# Patient Record
Sex: FEMALE | Race: BLACK OR AFRICAN AMERICAN | URBAN - METROPOLITAN AREA
[De-identification: names, ages, dates, MRNs, and addresses within clinical notes are randomized per-mention and may not be internally consistent; named-entity substitution may affect disease eponyms.]

---

## 2022-03-07 ENCOUNTER — HISTORICAL (OUTPATIENT)
Dept: ADMINISTRATIVE | Facility: HOSPITAL | Age: 37
End: 2022-03-07

## 2022-03-12 ENCOUNTER — HISTORICAL (OUTPATIENT)
Dept: ADMINISTRATIVE | Facility: HOSPITAL | Age: 37
End: 2022-03-12

## 2022-03-23 ENCOUNTER — HISTORICAL (OUTPATIENT)
Dept: ADMINISTRATIVE | Facility: HOSPITAL | Age: 37
End: 2022-03-23

## 2022-03-23 LAB
APPEARANCE, UA: CLEAR
BACTERIA SPEC CULT: NORMAL
BILIRUB UR QL STRIP: NEGATIVE
BUDDING YEAST: NORMAL
CASTS, UA: NORMAL
COLOR UR: YELLOW
CRP SERPL-MCNC: 0.43 MG/L
CRYSTALS: NORMAL
ERYTHROCYTE [SEDIMENTATION RATE] IN BLOOD: 18 MM/H (ref 0–20)
EST. AVERAGE GLUCOSE BLD GHB EST-MCNC: 128.4 MG/DL
GLUCOSE (UA): NEGATIVE
HBA1C MFR BLD: 6.1 %
HGB UR QL STRIP: NORMAL
KETONES UR QL STRIP: NEGATIVE
LEUKOCYTE ESTERASE UR QL STRIP: NORMAL
NITRITE UR QL STRIP: NEGATIVE
PH UR STRIP: 6 [PH] (ref 5–9)
PROT UR QL STRIP: NEGATIVE
RBC #/AREA URNS HPF: 22 /[HPF] (ref 0–2)
SMALL ROUND CELLS, UA: NORMAL
SP GR UR STRIP: 1.02 (ref 1–1.03)
SPERM URNS QL MICRO: NORMAL
SQUAMOUS EPITHELIAL, UA: NORMAL (ref 0–4)
T3FREE SERPL-MCNC: 2.65 PG/ML (ref 1.58–3.91)
T4 SERPL-MCNC: 8.59 UG/DL (ref 4.87–11.72)
TSH SERPL-ACNC: 0.98 M[IU]/L (ref 0.35–4.94)
UROBILINOGEN UR STRIP-ACNC: 0.2
WBC #/AREA URNS HPF: NORMAL /[HPF] (ref 0–2)

## 2025-06-02 ENCOUNTER — OFFICE VISIT (OUTPATIENT)
Dept: INTERNAL MEDICINE | Facility: CLINIC | Age: 40
End: 2025-06-02
Payer: MEDICAID

## 2025-06-02 ENCOUNTER — LAB VISIT (OUTPATIENT)
Dept: LAB | Facility: HOSPITAL | Age: 40
End: 2025-06-02
Payer: MEDICAID

## 2025-06-02 VITALS
HEIGHT: 72 IN | HEART RATE: 74 BPM | RESPIRATION RATE: 16 BRPM | OXYGEN SATURATION: 100 % | TEMPERATURE: 98 F | DIASTOLIC BLOOD PRESSURE: 81 MMHG | BODY MASS INDEX: 39.68 KG/M2 | WEIGHT: 293 LBS | SYSTOLIC BLOOD PRESSURE: 125 MMHG

## 2025-06-02 DIAGNOSIS — Z00.00 WELL ADULT EXAM: ICD-10-CM

## 2025-06-02 DIAGNOSIS — Z11.3 SCREENING FOR STD (SEXUALLY TRANSMITTED DISEASE): ICD-10-CM

## 2025-06-02 DIAGNOSIS — Z80.3 FAMILY HISTORY OF BREAST CANCER: ICD-10-CM

## 2025-06-02 DIAGNOSIS — Z00.00 WELL ADULT EXAM: Primary | ICD-10-CM

## 2025-06-02 DIAGNOSIS — Z87.19 HISTORY OF SMALL BOWEL OBSTRUCTION: ICD-10-CM

## 2025-06-02 LAB
ALBUMIN SERPL-MCNC: 3.8 G/DL (ref 3.5–5)
ALBUMIN/GLOB SERPL: 1 RATIO (ref 1.1–2)
ALP SERPL-CCNC: 79 UNIT/L (ref 40–150)
ALT SERPL-CCNC: 12 UNIT/L (ref 0–55)
ANION GAP SERPL CALC-SCNC: 7 MEQ/L
AST SERPL-CCNC: 15 UNIT/L (ref 11–45)
BACTERIA #/AREA URNS AUTO: ABNORMAL /HPF
BASOPHILS # BLD AUTO: 0.04 X10(3)/MCL
BASOPHILS NFR BLD AUTO: 0.8 %
BILIRUB SERPL-MCNC: 0.5 MG/DL
BILIRUB UR QL STRIP.AUTO: NEGATIVE
BUN SERPL-MCNC: 12 MG/DL (ref 7–18.7)
C TRACH DNA SPEC QL NAA+PROBE: NOT DETECTED
CALCIUM SERPL-MCNC: 9.7 MG/DL (ref 8.4–10.2)
CHLORIDE SERPL-SCNC: 109 MMOL/L (ref 98–107)
CHOLEST SERPL-MCNC: 228 MG/DL
CHOLEST/HDLC SERPL: 5 {RATIO} (ref 0–5)
CLARITY UR: CLEAR
CO2 SERPL-SCNC: 24 MMOL/L (ref 22–29)
COLOR UR AUTO: YELLOW
CREAT SERPL-MCNC: 0.87 MG/DL (ref 0.55–1.02)
CREAT/UREA NIT SERPL: 14
EOSINOPHIL # BLD AUTO: 0.09 X10(3)/MCL (ref 0–0.9)
EOSINOPHIL NFR BLD AUTO: 1.8 %
ERYTHROCYTE [DISTWIDTH] IN BLOOD BY AUTOMATED COUNT: 13.8 % (ref 11.5–17)
EST. AVERAGE GLUCOSE BLD GHB EST-MCNC: 125.5 MG/DL
GFR SERPLBLD CREATININE-BSD FMLA CKD-EPI: >60 ML/MIN/1.73/M2
GLOBULIN SER-MCNC: 4 GM/DL (ref 2.4–3.5)
GLUCOSE SERPL-MCNC: 101 MG/DL (ref 74–100)
GLUCOSE UR QL STRIP: NORMAL
HAV IGM SERPL QL IA: NONREACTIVE
HBA1C MFR BLD: 6 %
HBV CORE IGM SERPL QL IA: NONREACTIVE
HBV SURFACE AG SERPL QL IA: NONREACTIVE
HCT VFR BLD AUTO: 43.4 % (ref 37–47)
HCV AB SERPL QL IA: NONREACTIVE
HDLC SERPL-MCNC: 45 MG/DL (ref 35–60)
HGB BLD-MCNC: 13.3 G/DL (ref 12–16)
HGB UR QL STRIP: NEGATIVE
HIV 1+2 AB+HIV1 P24 AG SERPL QL IA: NONREACTIVE
HYALINE CASTS #/AREA URNS LPF: ABNORMAL /LPF
IMM GRANULOCYTES # BLD AUTO: 0.01 X10(3)/MCL (ref 0–0.04)
IMM GRANULOCYTES NFR BLD AUTO: 0.2 %
KETONES UR QL STRIP: NEGATIVE
LDLC SERPL CALC-MCNC: 165 MG/DL (ref 50–140)
LEUKOCYTE ESTERASE UR QL STRIP: NEGATIVE
LYMPHOCYTES # BLD AUTO: 2.12 X10(3)/MCL (ref 0.6–4.6)
LYMPHOCYTES NFR BLD AUTO: 42.4 %
MCH RBC QN AUTO: 25.1 PG (ref 27–31)
MCHC RBC AUTO-ENTMCNC: 30.6 G/DL (ref 33–36)
MCV RBC AUTO: 81.9 FL (ref 80–94)
MONOCYTES # BLD AUTO: 0.32 X10(3)/MCL (ref 0.1–1.3)
MONOCYTES NFR BLD AUTO: 6.4 %
MUCOUS THREADS URNS QL MICRO: ABNORMAL /LPF
N GONORRHOEA DNA SPEC QL NAA+PROBE: NOT DETECTED
NEUTROPHILS # BLD AUTO: 2.42 X10(3)/MCL (ref 2.1–9.2)
NEUTROPHILS NFR BLD AUTO: 48.4 %
NITRITE UR QL STRIP: NEGATIVE
NRBC BLD AUTO-RTO: 0 %
PH UR STRIP: 5.5 [PH]
PLATELET # BLD AUTO: 321 X10(3)/MCL (ref 130–400)
PLATELETS.RETICULATED NFR BLD AUTO: 1.9 % (ref 0.9–11.2)
PMV BLD AUTO: 9.8 FL (ref 7.4–10.4)
POTASSIUM SERPL-SCNC: 3.7 MMOL/L (ref 3.5–5.1)
PROT SERPL-MCNC: 7.8 GM/DL (ref 6.4–8.3)
PROT UR QL STRIP: NEGATIVE
RBC # BLD AUTO: 5.3 X10(6)/MCL (ref 4.2–5.4)
RBC #/AREA URNS AUTO: ABNORMAL /HPF
SODIUM SERPL-SCNC: 140 MMOL/L (ref 136–145)
SP GR UR STRIP.AUTO: 1.03 (ref 1–1.03)
SPECIMEN SOURCE: NORMAL
SQUAMOUS #/AREA URNS LPF: ABNORMAL /HPF
T PALLIDUM AB SER QL: NONREACTIVE
T4 FREE SERPL-MCNC: 0.95 NG/DL (ref 0.7–1.48)
TRIGL SERPL-MCNC: 91 MG/DL (ref 37–140)
TSH SERPL-ACNC: 1.62 UIU/ML (ref 0.35–4.94)
UROBILINOGEN UR STRIP-ACNC: NORMAL
VLDLC SERPL CALC-MCNC: 18 MG/DL
WBC # BLD AUTO: 5 X10(3)/MCL (ref 4.5–11.5)
WBC #/AREA URNS AUTO: ABNORMAL /HPF

## 2025-06-02 PROCEDURE — 80061 LIPID PANEL: CPT

## 2025-06-02 PROCEDURE — 99204 OFFICE O/P NEW MOD 45 MIN: CPT | Mod: PBBFAC

## 2025-06-02 PROCEDURE — 99385 PREV VISIT NEW AGE 18-39: CPT | Mod: S$PBB,,,

## 2025-06-02 PROCEDURE — 85025 COMPLETE CBC W/AUTO DIFF WBC: CPT

## 2025-06-02 PROCEDURE — 80074 ACUTE HEPATITIS PANEL: CPT

## 2025-06-02 PROCEDURE — 3079F DIAST BP 80-89 MM HG: CPT | Mod: CPTII,,,

## 2025-06-02 PROCEDURE — 3074F SYST BP LT 130 MM HG: CPT | Mod: CPTII,,,

## 2025-06-02 PROCEDURE — 84439 ASSAY OF FREE THYROXINE: CPT

## 2025-06-02 PROCEDURE — 86780 TREPONEMA PALLIDUM: CPT

## 2025-06-02 PROCEDURE — 1159F MED LIST DOCD IN RCRD: CPT | Mod: CPTII,,,

## 2025-06-02 PROCEDURE — 80053 COMPREHEN METABOLIC PANEL: CPT

## 2025-06-02 PROCEDURE — 84443 ASSAY THYROID STIM HORMONE: CPT

## 2025-06-02 PROCEDURE — 87389 HIV-1 AG W/HIV-1&-2 AB AG IA: CPT

## 2025-06-02 PROCEDURE — 36415 COLL VENOUS BLD VENIPUNCTURE: CPT

## 2025-06-02 PROCEDURE — 3008F BODY MASS INDEX DOCD: CPT | Mod: CPTII,,,

## 2025-06-02 PROCEDURE — 1160F RVW MEDS BY RX/DR IN RCRD: CPT | Mod: CPTII,,,

## 2025-06-02 PROCEDURE — 87491 CHLMYD TRACH DNA AMP PROBE: CPT

## 2025-06-02 PROCEDURE — 83036 HEMOGLOBIN GLYCOSYLATED A1C: CPT

## 2025-06-02 PROCEDURE — 81015 MICROSCOPIC EXAM OF URINE: CPT

## 2025-06-02 RX ORDER — CHOLECALCIFEROL (VITAMIN D3) 25 MCG
1000 TABLET ORAL DAILY
COMMUNITY

## 2025-06-03 ENCOUNTER — HOSPITAL ENCOUNTER (OUTPATIENT)
Dept: RADIOLOGY | Facility: HOSPITAL | Age: 40
Discharge: HOME | End: 2025-06-03
Payer: MEDICAID

## 2025-06-03 DIAGNOSIS — Z80.3 FAMILY HISTORY OF BREAST CANCER: ICD-10-CM

## 2025-06-03 PROCEDURE — 77063 BREAST TOMOSYNTHESIS BI: CPT | Mod: TC

## 2025-06-04 ENCOUNTER — OFFICE VISIT (OUTPATIENT)
Dept: GYNECOLOGY | Facility: CLINIC | Age: 40
End: 2025-06-04
Payer: MEDICAID

## 2025-06-04 VITALS
RESPIRATION RATE: 18 BRPM | OXYGEN SATURATION: 99 % | BODY MASS INDEX: 39.68 KG/M2 | HEART RATE: 79 BPM | WEIGHT: 293 LBS | DIASTOLIC BLOOD PRESSURE: 80 MMHG | TEMPERATURE: 98 F | SYSTOLIC BLOOD PRESSURE: 119 MMHG | HEIGHT: 72 IN

## 2025-06-04 DIAGNOSIS — N94.6 DYSMENORRHEA: ICD-10-CM

## 2025-06-04 DIAGNOSIS — Z00.00 WELL ADULT EXAM: ICD-10-CM

## 2025-06-04 DIAGNOSIS — D25.9 UTERINE LEIOMYOMA, UNSPECIFIED LOCATION: ICD-10-CM

## 2025-06-04 DIAGNOSIS — Z12.4 ENCOUNTER FOR PAPANICOLAOU SMEAR FOR CERVICAL CANCER SCREENING: Primary | ICD-10-CM

## 2025-06-04 LAB
B-HCG UR QL: NEGATIVE
CTP QC/QA: YES

## 2025-06-04 PROCEDURE — 3074F SYST BP LT 130 MM HG: CPT | Mod: CPTII,,,

## 2025-06-04 PROCEDURE — 99385 PREV VISIT NEW AGE 18-39: CPT | Mod: S$PBB,,,

## 2025-06-04 PROCEDURE — 81025 URINE PREGNANCY TEST: CPT | Mod: PBBFAC

## 2025-06-04 PROCEDURE — 88174 CYTOPATH C/V AUTO IN FLUID: CPT

## 2025-06-04 PROCEDURE — 3079F DIAST BP 80-89 MM HG: CPT | Mod: CPTII,,,

## 2025-06-04 PROCEDURE — 1159F MED LIST DOCD IN RCRD: CPT | Mod: CPTII,,,

## 2025-06-04 PROCEDURE — 3008F BODY MASS INDEX DOCD: CPT | Mod: CPTII,,,

## 2025-06-04 PROCEDURE — 3044F HG A1C LEVEL LT 7.0%: CPT | Mod: CPTII,,,

## 2025-06-04 PROCEDURE — 99214 OFFICE O/P EST MOD 30 MIN: CPT | Mod: PBBFAC

## 2025-06-04 RX ORDER — ZINC SULFATE 50(220)MG
CAPSULE ORAL
COMMUNITY

## 2025-06-04 RX ORDER — BLOOD PRESSURE TEST KIT-WRIST
KIT MISCELLANEOUS
COMMUNITY

## 2025-06-09 ENCOUNTER — RESULTS FOLLOW-UP (OUTPATIENT)
Dept: INTERNAL MEDICINE | Facility: CLINIC | Age: 40
End: 2025-06-09

## 2025-06-09 ENCOUNTER — HOSPITAL ENCOUNTER (OUTPATIENT)
Dept: RADIOLOGY | Facility: HOSPITAL | Age: 40
Discharge: HOME | End: 2025-06-09
Payer: MEDICAID

## 2025-06-09 DIAGNOSIS — N94.6 DYSMENORRHEA: ICD-10-CM

## 2025-06-09 DIAGNOSIS — D25.9 UTERINE LEIOMYOMA, UNSPECIFIED LOCATION: ICD-10-CM

## 2025-06-09 PROCEDURE — 76830 TRANSVAGINAL US NON-OB: CPT | Mod: TC

## 2025-06-12 ENCOUNTER — TELEPHONE (OUTPATIENT)
Dept: INTERNAL MEDICINE | Facility: CLINIC | Age: 40
End: 2025-06-12
Payer: MEDICAID

## 2025-06-16 ENCOUNTER — TELEPHONE (OUTPATIENT)
Dept: INTERNAL MEDICINE | Facility: CLINIC | Age: 40
End: 2025-06-16
Payer: MEDICAID

## 2025-06-17 DIAGNOSIS — E78.2 MIXED HYPERLIPIDEMIA: ICD-10-CM

## 2025-06-17 DIAGNOSIS — M25.50 ARTHRALGIA, UNSPECIFIED JOINT: Primary | ICD-10-CM

## 2025-06-17 DIAGNOSIS — R73.03 PREDIABETES: ICD-10-CM

## 2025-06-17 NOTE — PROGRESS NOTES
Pt followed by Genoveva Lewis NP who is out on maternity leave. Pt called clinic wanting to discuss her lab results. Informed pt CBC, thyroid labs, Hep/HIV screening, UA, MMG results all normal. Informed Chloride level was slightly elevated indicating she needs to drink more water. Informed pt her A1c os 6.0 and encouraged ADA diet and exercise especially with family hx of DM. Informed Cholesterol level 228 and . Encouraged low fat diet and exercise- incorporate more leaner meats in diet and bake and broil foods instead of frying. Informed pt will repeat CMP, FLP, A1c in 6 months. Pt states she has been having some hand pain and her mom was diagnosed with RA in her early 40s and she is requesting to be screened. Informed will add auto immune labs to blood work ordered in 6 months to be done 1 week prior to her appt with Genoveva 12-9-25. Pt verbalized understanding and thanked me for the call.

## 2025-06-18 ENCOUNTER — TELEPHONE (OUTPATIENT)
Dept: GYNECOLOGY | Facility: CLINIC | Age: 40
End: 2025-06-18
Payer: MEDICAID

## 2025-06-18 NOTE — TELEPHONE ENCOUNTER
verified. Pelvic uls results explained, known hx of fibroids, patient admits pelvic pain noted occ. Desires future pregnancy possibilities. Requesting appt with GYN docs for pelvic pain/fibroids. Front staff to schedule appt with GYN docs.

## 2025-08-01 ENCOUNTER — PATIENT MESSAGE (OUTPATIENT)
Dept: INTERNAL MEDICINE | Facility: CLINIC | Age: 40
End: 2025-08-01
Payer: MEDICAID

## 2025-08-01 ENCOUNTER — TELEPHONE (OUTPATIENT)
Dept: INTERNAL MEDICINE | Facility: CLINIC | Age: 40
End: 2025-08-01
Payer: MEDICAID

## 2025-08-01 NOTE — TELEPHONE ENCOUNTER
Copied from CRM #[id number]. Topic: General Inquiry - Patient Advice  >> Aug 1, 2025 10:23 AM David wrote:  Who Called: Nery Cline    Caller is requesting assistance/information from provider's office.    Symptoms (please be specific):    How long has patient had these symptoms:    List of preferred pharmacies on file (remove unneeded): [unfilled]  If different, enter pharmacy into here including location and phone number:     Patient's Preferred Phone Number on File: [phone]   Best Call Back Number, if different:    Additional Information: pt called to req referral to a podiatrist, for foot pain since May, pt would like to see , Dr Olga NAIR# [phone]

## 2025-08-05 ENCOUNTER — OFFICE VISIT (OUTPATIENT)
Dept: INTERNAL MEDICINE | Facility: CLINIC | Age: 40
End: 2025-08-05
Payer: MEDICAID

## 2025-08-05 VITALS
OXYGEN SATURATION: 97 % | RESPIRATION RATE: 18 BRPM | DIASTOLIC BLOOD PRESSURE: 70 MMHG | HEIGHT: 72 IN | TEMPERATURE: 98 F | BODY MASS INDEX: 39.68 KG/M2 | HEART RATE: 71 BPM | SYSTOLIC BLOOD PRESSURE: 110 MMHG | WEIGHT: 293 LBS

## 2025-08-05 DIAGNOSIS — G89.29 CHRONIC PAIN OF BOTH FEET: Primary | ICD-10-CM

## 2025-08-05 DIAGNOSIS — M79.671 CHRONIC PAIN OF BOTH FEET: Primary | ICD-10-CM

## 2025-08-05 DIAGNOSIS — M79.672 CHRONIC PAIN OF BOTH FEET: Primary | ICD-10-CM

## 2025-08-05 PROBLEM — M79.89 RIGHT LEG SWELLING: Status: ACTIVE | Noted: 2025-08-05

## 2025-08-05 PROCEDURE — 3044F HG A1C LEVEL LT 7.0%: CPT | Mod: CPTII,,, | Performed by: NURSE PRACTITIONER

## 2025-08-05 PROCEDURE — 1159F MED LIST DOCD IN RCRD: CPT | Mod: CPTII,,, | Performed by: NURSE PRACTITIONER

## 2025-08-05 PROCEDURE — 1160F RVW MEDS BY RX/DR IN RCRD: CPT | Mod: CPTII,,, | Performed by: NURSE PRACTITIONER

## 2025-08-05 PROCEDURE — 3074F SYST BP LT 130 MM HG: CPT | Mod: CPTII,,, | Performed by: NURSE PRACTITIONER

## 2025-08-05 PROCEDURE — 99214 OFFICE O/P EST MOD 30 MIN: CPT | Mod: S$PBB,,, | Performed by: NURSE PRACTITIONER

## 2025-08-05 PROCEDURE — 3008F BODY MASS INDEX DOCD: CPT | Mod: CPTII,,, | Performed by: NURSE PRACTITIONER

## 2025-08-05 PROCEDURE — 99215 OFFICE O/P EST HI 40 MIN: CPT | Mod: PBBFAC | Performed by: NURSE PRACTITIONER

## 2025-08-05 PROCEDURE — 3078F DIAST BP <80 MM HG: CPT | Mod: CPTII,,, | Performed by: NURSE PRACTITIONER

## 2025-08-05 RX ORDER — DICLOFENAC SODIUM 10 MG/G
2 GEL TOPICAL 4 TIMES DAILY PRN
Qty: 450 G | Refills: 3 | Status: SHIPPED | OUTPATIENT
Start: 2025-08-05

## 2025-08-05 RX ORDER — IBUPROFEN 800 MG/1
800 TABLET, FILM COATED ORAL 2 TIMES DAILY PRN
Qty: 40 TABLET | Refills: 1 | Status: SHIPPED | OUTPATIENT
Start: 2025-08-05

## 2025-08-05 NOTE — PROGRESS NOTES
Shira L Tereso, NP   [facility]  [address] [facility] [address]      PATIENT NAME: Nery Cline  : 1985  DATE: 25  MRN: [id number]        History of Present Illness / Problem Focused Workflow     Nery Cline presents to the clinic with Follow-up and Podiatry referral      39 yo female unaccompanied for request for podiatry referral. Pt reports b/l feet pain, R>L ongoing for months. Moved here in the last few months and started exercising regularly (walking at local park). Uses proper tennis shoes with arch support. Painful to back of right ankle/ heel and in the arch. Has noticed swelling intermittently and erythema at times. Stretches, does calf raises and uses ice at times. Takes OTC Ibuprofen with some relief. Wants to continue to exercise for weight loss purposes and overall health. Would like to see specialist for other treatment options.    PCP YARELIS Lewis, last seen 25. PMH includes history kidney stones, uterine fibroids, bowel obstruction.    Review of Systems     Review of Systems   Constitutional: Negative.    HENT: Negative.     Eyes: Negative.    Respiratory: Negative.     Cardiovascular: Negative.    Gastrointestinal: Negative.    Endocrine: Negative.    Genitourinary: Negative.    Musculoskeletal:  Positive for arthralgias (b/l feet pain).   Skin: Negative.    Allergic/Immunologic: Negative.    Neurological: Negative.    Hematological: Negative.    Psychiatric/Behavioral: Negative.         Medical / Social / Family History     -------------------------------------    Fibroids    Uterine        Past Surgical History:   Procedure Laterality Date    COLONOSCOPY  2023       Social History[1]     Family History   Problem Relation Name Age of Onset    Hypertension Mother      Diabetes Mother      Cancer Father      Diabetes Father      Cancer Paternal Aunt          Medications and Allergies      Medications  Current Outpatient Medications   Medication Instructions    diclofenac sodium (VOLTAREN) 2 g, Topical (Top), 4 times daily PRN    ibuprofen (ADVIL,MOTRIN) 800 mg, Oral, 2 times daily PRN, Take with food. Avoid Motrin, Aleve, Advil, Naproxen.    magnesium glycinate 100 mg magnesium Cap     multivit with calcium,iron,min (WOMEN'S MULTIPLE VITAMINS ORAL)     vitamin D (VITAMIN D3) 1,000 Units, Daily    zinc sulfate (ZINC-220) 50 mg zinc (220 mg) capsule        Allergies  Review of patient's allergies indicates:  No Known Allergies    Physical Examination     Visit Vitals  /70 (BP Location: Left arm, Patient Position: Sitting)   Pulse 71   Temp 98.3 °F (36.8 °C) (Oral)   Resp 18   Ht 6' (1.829 m)   Wt (!) 137.2 kg (302 lb 6.4 oz)   LMP 08/05/2025 (Exact Date)   SpO2 97%   BMI 41.01 kg/m²       Physical Exam  Constitutional:       General: She is not in acute distress.     Appearance: Normal appearance. She is obese. She is not ill-appearing, toxic-appearing or diaphoretic.   HENT:      Head: Normocephalic.   Cardiovascular:      Rate and Rhythm: Normal rate.   Pulmonary:      Effort: Pulmonary effort is normal. No respiratory distress.   Musculoskeletal:      Right ankle: Tenderness present.      Right foot: Tenderness present.        Feet:    Skin:     General: Skin is warm and dry.   Neurological:      Mental Status: She is alert and oriented to person, place, and time. Mental status is at baseline.      Coordination: Coordination normal.      Gait: Gait normal.   Psychiatric:         Mood and Affect: Mood normal.         Behavior: Behavior normal.         Thought Content: Thought content normal.         Judgment: Judgment normal.           Results     Assessment       ICD-10-CM ICD-9-CM   1. Chronic pain of both feet  M79.671 729.5    G89.29 338.29    M79.672        Plan       Problem List Items Addressed This Visit          Orthopedic    Chronic pain of both feet - Primary  Pt with c/o b/l  feet pain, R>L ongoing x months. Started exercising regularly and wants to continue. Rx as below.   Will obtain XR today, call with results.   Referral to podiatry as pt requested. If no appointment received within 1-2 weeks, notify [facility] clinic.   Keep f/u with PCP as scheduled or call for sooner if needed.    Relevant Medications    ibuprofen (ADVIL,MOTRIN) 800 MG tablet    diclofenac sodium (VOLTAREN) 1 % Gel    Other Relevant Orders    X-Ray Foot Complete Right    X-Ray Ankle 2 View Right    Ambulatory referral/consult to Podiatry       Future Appointments   Date Time Provider Department Center   9/24/2025  9:45 AM RESIDENTS, [facility] GYN [facility] GYN [place]    10/15/2025 10:00 AM PROVIDER 2, [facility] BREAST SURGERY [facility] BRESUR [place]    12/24/2025  8:40 AM Genoveva Lewis FNP [facility] INTMED [place]    6/10/2026  1:30 PM Kristi Beasley FNP [facility] GYN [place]         Follow up if symptoms worsen or fail to improve.    Signature:  Shira Rider NP  [facility]  [address] [facility] [address]    Date of encounter: 8/5/25         [1]   Social History  Socioeconomic History    Marital status: Single   Tobacco Use    Smoking status: Never     Passive exposure: Never    Smokeless tobacco: Never   Substance and Sexual Activity    Alcohol use: Not Currently    Drug use: Never    Sexual activity: Not Currently     Social Drivers of Health     Financial Resource Strain: Medium Risk (6/1/2025)    Overall Financial Resource Strain (CARDIA)     Difficulty of Paying Living Expenses: Somewhat hard   Food Insecurity: No Food Insecurity (6/1/2025)    Hunger Vital Sign     Worried About Running Out of Food in the Last Year: Never true     Ran Out of Food in the Last Year: Never true   Transportation Needs: No Transportation Needs (6/1/2025)    PRAPARE - Transportation     Lack of Transportation (Medical): No     Lack of Transportation (Non-Medical): No    Physical Activity: Insufficiently Active (6/1/2025)    Exercise Vital Sign     Days of Exercise per Week: 2 days     Minutes of Exercise per Session: 20 min   Stress: No Stress Concern Present (6/1/2025)    [employer] - Occupational Stress Questionnaire     Feeling of Stress : Not at all   Housing Stability: Low Risk  (6/1/2025)    Housing Stability Vital Sign     Unable to Pay for Housing in the Last Year: No     Number of Times Moved in the Last Year: 1     Homeless in the Last Year: No

## 2025-08-08 ENCOUNTER — HOSPITAL ENCOUNTER (OUTPATIENT)
Dept: RADIOLOGY | Facility: HOSPITAL | Age: 40
Discharge: HOME | End: 2025-08-08
Attending: NURSE PRACTITIONER
Payer: MEDICAID

## 2025-08-08 DIAGNOSIS — M79.672 CHRONIC PAIN OF BOTH FEET: ICD-10-CM

## 2025-08-08 DIAGNOSIS — G89.29 CHRONIC PAIN OF BOTH FEET: ICD-10-CM

## 2025-08-08 DIAGNOSIS — M79.671 CHRONIC PAIN OF BOTH FEET: ICD-10-CM

## 2025-08-08 PROCEDURE — 73600 X-RAY EXAM OF ANKLE: CPT | Mod: TC,RT

## 2025-08-08 PROCEDURE — 73630 X-RAY EXAM OF FOOT: CPT | Mod: TC,RT

## 2025-08-11 ENCOUNTER — RESULTS FOLLOW-UP (OUTPATIENT)
Dept: INTERNAL MEDICINE | Facility: CLINIC | Age: 40
End: 2025-08-11
Payer: MEDICAID

## 2025-08-11 ENCOUNTER — TELEPHONE (OUTPATIENT)
Dept: INTERNAL MEDICINE | Facility: CLINIC | Age: 40
End: 2025-08-11
Payer: MEDICAID